# Patient Record
Sex: FEMALE | Race: WHITE | Employment: OTHER | URBAN - NONMETROPOLITAN AREA
[De-identification: names, ages, dates, MRNs, and addresses within clinical notes are randomized per-mention and may not be internally consistent; named-entity substitution may affect disease eponyms.]

---

## 2018-11-09 ENCOUNTER — HOSPITAL ENCOUNTER (EMERGENCY)
Age: 48
Discharge: HOME | End: 2018-11-09
Attending: EMERGENCY MEDICINE
Payer: MEDICAID

## 2018-11-09 VITALS
DIASTOLIC BLOOD PRESSURE: 105 MMHG | HEART RATE: 76 BPM | OXYGEN SATURATION: 98 % | WEIGHT: 200 LBS | SYSTOLIC BLOOD PRESSURE: 157 MMHG | RESPIRATION RATE: 16 BRPM | TEMPERATURE: 97.7 F

## 2018-11-09 DIAGNOSIS — S76.211A GROIN STRAIN, RIGHT, INITIAL ENCOUNTER: Primary | ICD-10-CM

## 2018-11-09 PROCEDURE — 99283 EMERGENCY DEPT VISIT LOW MDM: CPT

## 2018-11-09 RX ORDER — CYCLOBENZAPRINE HCL 10 MG
10 TABLET ORAL 3 TIMES DAILY PRN
Qty: 30 TABLET | Refills: 0 | Status: SHIPPED | OUTPATIENT
Start: 2018-11-09

## 2018-11-09 ASSESSMENT — PAIN DESCRIPTION - ORIENTATION: ORIENTATION: RIGHT

## 2018-11-09 ASSESSMENT — PAIN DESCRIPTION - LOCATION: LOCATION: GROIN

## 2018-11-09 ASSESSMENT — PAIN SCALES - GENERAL: PAINLEVEL_OUTOF10: 8

## 2018-11-09 NOTE — ED PROVIDER NOTES
Dispense:  30 tablet     Refill:  0        Vitals:   -------------------------  BP (!) 157/105   Pulse 76   Temp 97.7 °F (36.5 °C) (Tympanic)   Resp 16   Wt 200 lb (90.7 kg)   SpO2 98%         I have reviewed the disposition diagnosis with the patient and or their family/guardian. I have answered their questions and given discharge instructions. They voiced understanding of these instructions and did not have any furtherquestions or complaints. CRITICAL CARE:    None    CONSULTS:    None    PROCEDURES:    None      [facility] Report if indicated             FINAL IMPRESSION      1. Groin strain, right, initial encounter          DISPOSITION/PLAN   DISPOSITION Decision To Discharge 11/09/2018 12:13:17 PM        PATIENT REFERRED TO:  No follow-up provider specified.     DISCHARGE MEDICATIONS:  New Prescriptions    CYCLOBENZAPRINE (FLEXERIL) 10 MG TABLET    Take 1 tablet by mouth 3 times daily as needed for Muscle spasms       (Please note that portions of thisnote were completed with a voice recognition program.  Efforts were made to edit the dictations but occasionally words are mis-transcribed.)    Nneka Bolton, DO  Attending Emergency Physician        Nneka Bolton DO  11/09/18 [id number]

## 2023-05-04 ENCOUNTER — OFFICE VISIT (OUTPATIENT)
Dept: FAMILY MEDICINE CLINIC | Age: 53
End: 2023-05-04

## 2023-05-04 VITALS
BODY MASS INDEX: 28.95 KG/M2 | HEART RATE: 85 BPM | DIASTOLIC BLOOD PRESSURE: 82 MMHG | OXYGEN SATURATION: 96 % | RESPIRATION RATE: 16 BRPM | WEIGHT: 191 LBS | HEIGHT: 68 IN | SYSTOLIC BLOOD PRESSURE: 138 MMHG

## 2023-05-04 DIAGNOSIS — S39.012A STRAIN OF LUMBAR PARASPINOUS MUSCLE, INITIAL ENCOUNTER: Primary | ICD-10-CM

## 2023-05-04 DIAGNOSIS — M54.50 CHRONIC BILATERAL LOW BACK PAIN WITHOUT SCIATICA: ICD-10-CM

## 2023-05-04 DIAGNOSIS — G89.29 CHRONIC BILATERAL LOW BACK PAIN WITHOUT SCIATICA: ICD-10-CM

## 2023-05-04 DIAGNOSIS — R10.31 BILATERAL GROIN PAIN: ICD-10-CM

## 2023-05-04 DIAGNOSIS — R10.32 BILATERAL GROIN PAIN: ICD-10-CM

## 2023-05-04 RX ORDER — NAPROXEN 500 MG/1
500 TABLET ORAL 2 TIMES DAILY PRN
Qty: 60 TABLET | Refills: 0 | Status: SHIPPED | OUTPATIENT
Start: 2023-05-04

## 2023-05-04 RX ADMIN — TRIAMCINOLONE ACETONIDE 40 MG: 40 INJECTION, SUSPENSION INTRA-ARTICULAR; INTRAMUSCULAR at 17:45

## 2023-05-04 SDOH — ECONOMIC STABILITY: FOOD INSECURITY: WITHIN THE PAST 12 MONTHS, YOU WORRIED THAT YOUR FOOD WOULD RUN OUT BEFORE YOU GOT MONEY TO BUY MORE.: NEVER TRUE

## 2023-05-04 SDOH — ECONOMIC STABILITY: HOUSING INSECURITY
IN THE LAST 12 MONTHS, WAS THERE A TIME WHEN YOU DID NOT HAVE A STEADY PLACE TO SLEEP OR SLEPT IN A SHELTER (INCLUDING NOW)?: NO

## 2023-05-04 SDOH — ECONOMIC STABILITY: FOOD INSECURITY: WITHIN THE PAST 12 MONTHS, THE FOOD YOU BOUGHT JUST DIDN'T LAST AND YOU DIDN'T HAVE MONEY TO GET MORE.: NEVER TRUE

## 2023-05-04 SDOH — ECONOMIC STABILITY: INCOME INSECURITY: HOW HARD IS IT FOR YOU TO PAY FOR THE VERY BASICS LIKE FOOD, HOUSING, MEDICAL CARE, AND HEATING?: NOT HARD AT ALL

## 2023-05-04 ASSESSMENT — ENCOUNTER SYMPTOMS: BACK PAIN: 1

## 2023-05-04 ASSESSMENT — PATIENT HEALTH QUESTIONNAIRE - PHQ9
SUM OF ALL RESPONSES TO PHQ QUESTIONS 1-9: 0
1. LITTLE INTEREST OR PLEASURE IN DOING THINGS: 0
SUM OF ALL RESPONSES TO PHQ QUESTIONS 1-9: 0
2. FEELING DOWN, DEPRESSED OR HOPELESS: 0
SUM OF ALL RESPONSES TO PHQ QUESTIONS 1-9: 0
SUM OF ALL RESPONSES TO PHQ QUESTIONS 1-9: 0
SUM OF ALL RESPONSES TO PHQ9 QUESTIONS 1 & 2: 0

## 2023-05-04 NOTE — PROGRESS NOTES
medications, administration and side effects. Encouraged patient to follow up with PCP or return to the clinic for no improvement and or worsening of symptoms. All questions were addressed and answered with verbalization of understanding. The patient and/or the caregiver was agreeable with the plan.      Electronically signed by MOHINI Crawford CNP on 5/5/2023 at 8:08 AM

## 2023-05-05 RX ORDER — TRIAMCINOLONE ACETONIDE 40 MG/ML
40 INJECTION, SUSPENSION INTRA-ARTICULAR; INTRAMUSCULAR ONCE
Status: COMPLETED | OUTPATIENT
Start: 2023-05-05 | End: 2023-05-04

## 2023-05-05 RX ORDER — TRIAMCINOLONE ACETONIDE 40 MG/ML
40 INJECTION, SUSPENSION INTRA-ARTICULAR; INTRAMUSCULAR ONCE
Status: COMPLETED | OUTPATIENT
Start: 2023-05-05 | End: 2023-05-05

## 2023-05-05 RX ADMIN — TRIAMCINOLONE ACETONIDE 40 MG: 40 INJECTION, SUSPENSION INTRA-ARTICULAR; INTRAMUSCULAR at 09:44

## 2023-05-05 ASSESSMENT — ENCOUNTER SYMPTOMS
BOWEL INCONTINENCE: 0
DIARRHEA: 0
SHORTNESS OF BREATH: 0
ABDOMINAL PAIN: 0
NAUSEA: 0
CONSTIPATION: 0

## 2023-05-30 ENCOUNTER — OFFICE VISIT (OUTPATIENT)
Dept: FAMILY MEDICINE CLINIC | Age: 53
End: 2023-05-30
Payer: COMMERCIAL

## 2023-05-30 VITALS
SYSTOLIC BLOOD PRESSURE: 136 MMHG | HEART RATE: 82 BPM | BODY MASS INDEX: 27.98 KG/M2 | OXYGEN SATURATION: 95 % | RESPIRATION RATE: 16 BRPM | WEIGHT: 184 LBS | DIASTOLIC BLOOD PRESSURE: 106 MMHG

## 2023-05-30 DIAGNOSIS — G89.29 CHRONIC GROIN PAIN, UNSPECIFIED LATERALITY: ICD-10-CM

## 2023-05-30 DIAGNOSIS — I10 PRIMARY HYPERTENSION: ICD-10-CM

## 2023-05-30 DIAGNOSIS — F43.23 SITUATIONAL MIXED ANXIETY AND DEPRESSIVE DISORDER: ICD-10-CM

## 2023-05-30 DIAGNOSIS — M54.41 CHRONIC BILATERAL LOW BACK PAIN WITH BILATERAL SCIATICA: Primary | ICD-10-CM

## 2023-05-30 DIAGNOSIS — R10.30 CHRONIC GROIN PAIN, UNSPECIFIED LATERALITY: ICD-10-CM

## 2023-05-30 DIAGNOSIS — Z12.31 ENCOUNTER FOR SCREENING MAMMOGRAM FOR MALIGNANT NEOPLASM OF BREAST: ICD-10-CM

## 2023-05-30 DIAGNOSIS — M54.42 CHRONIC BILATERAL LOW BACK PAIN WITH BILATERAL SCIATICA: Primary | ICD-10-CM

## 2023-05-30 DIAGNOSIS — G89.29 CHRONIC BILATERAL LOW BACK PAIN WITH BILATERAL SCIATICA: Primary | ICD-10-CM

## 2023-05-30 DIAGNOSIS — Z12.11 SCREENING FOR COLON CANCER: ICD-10-CM

## 2023-05-30 PROCEDURE — 3078F DIAST BP <80 MM HG: CPT | Performed by: NURSE PRACTITIONER

## 2023-05-30 PROCEDURE — 3074F SYST BP LT 130 MM HG: CPT | Performed by: NURSE PRACTITIONER

## 2023-05-30 PROCEDURE — 99214 OFFICE O/P EST MOD 30 MIN: CPT | Performed by: NURSE PRACTITIONER

## 2023-05-30 RX ORDER — BUPROPION HYDROCHLORIDE 150 MG/1
150 TABLET ORAL EVERY MORNING
Qty: 30 TABLET | Refills: 0 | Status: SHIPPED | OUTPATIENT
Start: 2023-05-30

## 2023-05-30 RX ORDER — LOSARTAN POTASSIUM 25 MG/1
25 TABLET ORAL DAILY
Qty: 30 TABLET | Refills: 0 | Status: SHIPPED | OUTPATIENT
Start: 2023-05-30

## 2023-05-30 RX ORDER — TIZANIDINE 2 MG/1
2 TABLET ORAL EVERY 8 HOURS PRN
Qty: 60 TABLET | Refills: 0 | Status: SHIPPED | OUTPATIENT
Start: 2023-05-30 | End: 2023-06-29

## 2023-05-30 RX ORDER — HYDROCHLOROTHIAZIDE 12.5 MG/1
12.5 CAPSULE, GELATIN COATED ORAL DAILY
Qty: 30 CAPSULE | Refills: 0 | Status: SHIPPED | OUTPATIENT
Start: 2023-05-30

## 2023-05-30 ASSESSMENT — ENCOUNTER SYMPTOMS: BACK PAIN: 1

## 2023-05-30 NOTE — PROGRESS NOTES
Effort: Pulmonary effort is normal. No respiratory distress. Breath sounds: Normal breath sounds. No wheezing or rales. Musculoskeletal:         General: Tenderness present. Cervical back: Normal range of motion. Lumbar back: Spasms and tenderness present. No swelling, edema or bony tenderness. Decreased range of motion. Negative right straight leg raise test and negative left straight leg raise test.        Back:         Legs:       Comments: Modified straight leg raise test was negative bilaterally. Bilateral sciatic pain that flows posteriorly to mid thigh/knee region. Bilateral groin pain more with weightbearing and ambulation. Skin:     General: Skin is warm and dry. Findings: No rash. Neurological:      Mental Status: She is alert and oriented to person, place, and time. Sensory: No sensory deficit. Coordination: Coordination normal.      Gait: Gait normal.      Deep Tendon Reflexes:      Reflex Scores:       Patellar reflexes are 2+ on the right side and 2+ on the left side. Psychiatric:         Mood and Affect: Mood normal.         Behavior: Behavior normal.         Judgment: Judgment normal.         Discussed exam, POCT findings, plan of care, and follow-up at length with patient and/or their caregiver. Reviewed all prescribed and recommended medications, administration and side effects. All questions were addressed and answered with verbalization of understanding. The patient and/or the caregiver was agreeable with the plan.      (Please note that portions of this note were completed with a voice-recognition program. Efforts were made to edit the dictation but occasionally words are mis-transcribed.)    Electronically signed by Fredrick Lombard, APRN - CNP on 5/30/2023 at 1:38 PM

## 2023-05-31 ASSESSMENT — ENCOUNTER SYMPTOMS
COUGH: 0
GASTROINTESTINAL NEGATIVE: 1

## 2023-08-18 ENCOUNTER — TELEPHONE (OUTPATIENT)
Dept: FAMILY MEDICINE CLINIC | Age: 53
End: 2023-08-18

## 2023-08-18 NOTE — TELEPHONE ENCOUNTER
Attempted to contact patient to see if she would like to schedule her mammogram for the Felisha Cochran coming to our office on 8/29. Was unable to leave voicemail.

## 2024-04-12 ENCOUNTER — COMMUNITY OUTREACH (OUTPATIENT)
Dept: FAMILY MEDICINE CLINIC | Age: 54
End: 2024-04-12

## 2024-04-12 NOTE — PROGRESS NOTES
Patient's HM shows they are overdue for Mammogram, Colorectal Screening and Cervical Cancer Screening.  Care Everywhere and  files searched.  No results to attach to order nor HM updated.